# Patient Record
Sex: FEMALE | URBAN - METROPOLITAN AREA
[De-identification: names, ages, dates, MRNs, and addresses within clinical notes are randomized per-mention and may not be internally consistent; named-entity substitution may affect disease eponyms.]

---

## 2018-06-05 ENCOUNTER — APPOINTMENT (RX ONLY)
Dept: URBAN - METROPOLITAN AREA CLINIC 169 | Facility: CLINIC | Age: 23
Setting detail: DERMATOLOGY
End: 2018-06-05

## 2018-06-05 DIAGNOSIS — L29.89 OTHER PRURITUS: ICD-10-CM

## 2018-06-05 DIAGNOSIS — L29.8 OTHER PRURITUS: ICD-10-CM

## 2018-06-05 DIAGNOSIS — L83 ACANTHOSIS NIGRICANS: ICD-10-CM

## 2018-06-05 PROBLEM — J45.909 UNSPECIFIED ASTHMA, UNCOMPLICATED: Status: ACTIVE | Noted: 2018-06-05

## 2018-06-05 PROBLEM — L70.0 ACNE VULGARIS: Status: ACTIVE | Noted: 2018-06-05

## 2018-06-05 PROCEDURE — ? PRESCRIPTION

## 2018-06-05 PROCEDURE — ? TREATMENT REGIMEN

## 2018-06-05 PROCEDURE — ? DIAGNOSIS COMMENT

## 2018-06-05 PROCEDURE — 99202 OFFICE O/P NEW SF 15 MIN: CPT

## 2018-06-05 PROCEDURE — ? COUNSELING

## 2018-06-05 RX ORDER — HYDROCORTISONE 25 MG/G
1 OINTMENT TOPICAL TWICE A DAY
Qty: 1 | Refills: 0 | Status: ERX | COMMUNITY
Start: 2018-06-05

## 2018-06-05 RX ADMIN — HYDROCORTISONE 1: 25 OINTMENT TOPICAL at 19:09

## 2018-06-05 ASSESSMENT — LOCATION DETAILED DESCRIPTION DERM
LOCATION DETAILED: LEFT AXILLARY VAULT
LOCATION DETAILED: RIGHT AXILLARY VAULT
LOCATION DETAILED: RIGHT AREOLA
LOCATION DETAILED: MID POSTERIOR NECK

## 2018-06-05 ASSESSMENT — LOCATION ZONE DERM
LOCATION ZONE: TRUNK
LOCATION ZONE: NECK
LOCATION ZONE: AXILLAE

## 2018-06-05 ASSESSMENT — LOCATION SIMPLE DESCRIPTION DERM
LOCATION SIMPLE: RIGHT AXILLARY VAULT
LOCATION SIMPLE: LEFT AXILLARY VAULT
LOCATION SIMPLE: RIGHT BREAST
LOCATION SIMPLE: POSTERIOR NECK

## 2018-06-05 NOTE — PROCEDURE: DIAGNOSIS COMMENT
Comment: Rash or growth not appreciated on examination today.  I recommended the patient to a two-week course of hydrocortisone 2.5% ointment twice a day.  She will followup if the pruritus has not resolved at that point.  I counseled her that there is no growth that I am able to appreciate today but should it recur she will followup.  Her gynecologist did do a breast exam and it was within normal limits without any concerning changes.
Comment: The patient recently had her fasting glucose checked and it was within normal limits.  We discussed diet, exercise, and weight loss to help improve the acanthosis nigricans.  I recommended using an over-the-counter keratolytic such as AmLactin cream.
Detail Level: Detailed
Comment: Patient tested for diabetes, negative

## 2018-06-05 NOTE — PROCEDURE: TREATMENT REGIMEN
Detail Level: Zone
Initiate Treatment: Hydrocortisone ointment 2.5% apply to affected area twice a day, recommend she follow up in two weeks not any better

## 2018-06-05 NOTE — HPI: RASH
How Severe Is Your Rash?: moderate
Is This A New Presentation, Or A Follow-Up?: Rash
Additional History: Patient states she was recently tested for diabetes and does not have it.